# Patient Record
Sex: MALE | URBAN - METROPOLITAN AREA
[De-identification: names, ages, dates, MRNs, and addresses within clinical notes are randomized per-mention and may not be internally consistent; named-entity substitution may affect disease eponyms.]

---

## 2023-08-09 ENCOUNTER — OFFICE VISIT (OUTPATIENT)
Dept: URBAN - METROPOLITAN AREA CLINIC 82 | Facility: CLINIC | Age: 73
End: 2023-08-09

## 2023-10-13 ENCOUNTER — LAB OUTSIDE AN ENCOUNTER (OUTPATIENT)
Dept: URBAN - NONMETROPOLITAN AREA CLINIC 4 | Facility: CLINIC | Age: 73
End: 2023-10-13

## 2023-10-13 ENCOUNTER — OFFICE VISIT (OUTPATIENT)
Dept: URBAN - NONMETROPOLITAN AREA CLINIC 4 | Facility: CLINIC | Age: 73
End: 2023-10-13
Payer: MEDICARE

## 2023-10-13 VITALS
SYSTOLIC BLOOD PRESSURE: 116 MMHG | HEART RATE: 68 BPM | TEMPERATURE: 97.7 F | DIASTOLIC BLOOD PRESSURE: 66 MMHG | BODY MASS INDEX: 25.78 KG/M2 | WEIGHT: 151 LBS | HEIGHT: 64 IN

## 2023-10-13 DIAGNOSIS — Z86.010 PERSONAL HISTORY OF COLONIC POLYPS: ICD-10-CM

## 2023-10-13 DIAGNOSIS — K59.09 CHRONIC CONSTIPATION: ICD-10-CM

## 2023-10-13 DIAGNOSIS — K64.9 HEMORRHOIDS, UNSPECIFIED HEMORRHOID TYPE: ICD-10-CM

## 2023-10-13 PROBLEM — 428283002: Status: ACTIVE | Noted: 2023-10-13

## 2023-10-13 PROCEDURE — 99204 OFFICE O/P NEW MOD 45 MIN: CPT | Performed by: REGISTERED NURSE

## 2023-10-13 RX ORDER — SODIUM PICOSULFATE, MAGNESIUM OXIDE, AND ANHYDROUS CITRIC ACID 12; 3.5; 1 G/175ML; G/175ML; MG/175ML
175 ML THE FIRST DOSE THE EVENING BEFORE AND SECOND DOSE THE MORNING OF COLONOSCOPY LIQUID ORAL ONCE A DAY
Qty: 350 | OUTPATIENT
Start: 2023-10-13 | End: 2023-10-15

## 2023-10-13 RX ORDER — HYDROCORTISONE 10 MG/G
1 APPLICATION CREAM TOPICAL TWICE A DAY
Qty: 30 GRAM | Refills: 1 | OUTPATIENT
Start: 2023-10-13 | End: 2023-11-09

## 2023-10-13 NOTE — HPI-TODAY'S VISIT:
10/13/23: Pt is a 72 yo male with PMH of HLD, hemorrhoids who was referred by Dr. Fransisco Samano for screening colonoscopy and hemorrhoids.  A copy of this document will be sent to the referring physician.   Patient is [place] speaking therefore [employer]  used. Patient has had a colonoscopy about 9-10 years ago while living in [place] that revealed polyps. There is no family history of colon polyps or cancer.  He has a history of external hemorrhoids and they continue to bother him. He reports burning/itching sensation in his rectum. He has noticed blood on toilet paper. he has used rectal supp in past. He had hemorrhoid surgery in [place] 9-10 years ago.   Patient reports history of constipation. He takes Miralax as needed. He went to the pharmacy yesterday and told the pharmacist he was having a colonoscopy today and they gave him the instructions for Miralax bowel prep.   Patient denies cardiopulmonary disease, intake of blood thinners or problems with anesthesia. Recent blood work normal.

## 2023-10-30 ENCOUNTER — OFFICE VISIT (OUTPATIENT)
Dept: URBAN - METROPOLITAN AREA SURGERY CENTER 14 | Facility: SURGERY CENTER | Age: 73
End: 2023-10-30
Payer: MEDICARE

## 2023-10-30 ENCOUNTER — CLAIMS CREATED FROM THE CLAIM WINDOW (OUTPATIENT)
Dept: URBAN - METROPOLITAN AREA CLINIC 4 | Facility: CLINIC | Age: 73
End: 2023-10-30
Payer: MEDICARE

## 2023-10-30 DIAGNOSIS — K64.8 OTHER HEMORRHOIDS: ICD-10-CM

## 2023-10-30 DIAGNOSIS — K63.5 COLONIC POLYPS: ICD-10-CM

## 2023-10-30 DIAGNOSIS — D12.3 BENIGN NEOPLASM OF TRANSVERSE COLON: ICD-10-CM

## 2023-10-30 DIAGNOSIS — D12.3 ADENOMA OF TRANSVERSE COLON: ICD-10-CM

## 2023-10-30 DIAGNOSIS — Z86.010 PERSONAL HISTORY OF COLONIC POLYP: ICD-10-CM

## 2023-10-30 DIAGNOSIS — K57.30 DIVERTICULA OF COLON: ICD-10-CM

## 2023-10-30 DIAGNOSIS — Z86.010 ADENOMAS PERSONAL HISTORY OF COLONIC POLYPS: ICD-10-CM

## 2023-10-30 PROCEDURE — 88305 TISSUE EXAM BY PATHOLOGIST: CPT | Performed by: PATHOLOGY

## 2023-10-30 PROCEDURE — 00811 ANES LWR INTST NDSC NOS: CPT | Performed by: NURSE ANESTHETIST, CERTIFIED REGISTERED

## 2023-10-30 PROCEDURE — 45385 COLONOSCOPY W/LESION REMOVAL: CPT | Performed by: INTERNAL MEDICINE

## 2023-10-30 PROCEDURE — G8907 PT DOC NO EVENTS ON DISCHARG: HCPCS | Performed by: INTERNAL MEDICINE

## 2023-10-30 RX ORDER — HYDROCORTISONE 10 MG/G
1 APPLICATION CREAM TOPICAL TWICE A DAY
Qty: 30 GRAM | Refills: 1 | Status: ACTIVE | COMMUNITY
Start: 2023-10-13 | End: 2023-11-09

## 2023-12-01 ENCOUNTER — OFFICE VISIT (OUTPATIENT)
Dept: URBAN - NONMETROPOLITAN AREA CLINIC 4 | Facility: CLINIC | Age: 73
End: 2023-12-01
Payer: MEDICARE

## 2023-12-01 ENCOUNTER — DASHBOARD ENCOUNTERS (OUTPATIENT)
Age: 73
End: 2023-12-01

## 2023-12-01 VITALS
HEIGHT: 64 IN | SYSTOLIC BLOOD PRESSURE: 141 MMHG | TEMPERATURE: 97.7 F | DIASTOLIC BLOOD PRESSURE: 81 MMHG | BODY MASS INDEX: 26.56 KG/M2 | WEIGHT: 155.6 LBS | HEART RATE: 55 BPM

## 2023-12-01 DIAGNOSIS — K59.09 CHRONIC CONSTIPATION: ICD-10-CM

## 2023-12-01 DIAGNOSIS — K64.9 HEMORRHOIDS, UNSPECIFIED HEMORRHOID TYPE: ICD-10-CM

## 2023-12-01 DIAGNOSIS — Z86.010 PERSONAL HISTORY OF COLONIC POLYPS: ICD-10-CM

## 2023-12-01 PROCEDURE — 99214 OFFICE O/P EST MOD 30 MIN: CPT | Performed by: REGISTERED NURSE

## 2023-12-01 NOTE — HPI-TODAY'S VISIT:
10/13/23: Pt is a 72 yo male with PMH of HLD, hemorrhoids who was referred by Dr. Fransisco Samano for screening colonoscopy and hemorrhoids.  A copy of this document will be sent to the referring physician.   Patient is [place] speaking therefore [employer]  used. Patient has had a colonoscopy about 9-10 years ago while living in [place] that revealed polyps. There is no family history of colon polyps or cancer.  He has a history of external hemorrhoids and they continue to bother him. He reports burning/itching sensation in his rectum. He has noticed blood on toilet paper. he has used rectal supp in past. He had hemorrhoid surgery in [place] 9-10 years ago.   Patient reports history of constipation. He takes Miralax as needed. He went to the pharmacy yesterday and told the pharmacist he was having a colonoscopy today and they gave him the instructions for Miralax bowel prep.   Patient denies cardiopulmonary disease, intake of blood thinners or problems with anesthesia. Recent blood work normal.  12/1/23: Pt RTC for f/u. Had cscope 10/30/23: - Internal hemorrhoids. - Diverticulosis in the sigmoid colon, in the transverse colon and in the ascending colon.- One 3 mm polyp at the hepatic flexure, removed with a cold snare. Resected and retrieved.- The examined portion of the ileum was normal. Bx c/w TA polyp   He states Miralax does not help him. He drinks oatmilk with cinnamon which helps. He typically has 1 BM daily. He still has to strain. He continues to c/o rectal itching/secretions and external hemorrhoids. He has a hard time getting clean.